# Patient Record
Sex: MALE | Race: WHITE | NOT HISPANIC OR LATINO | URBAN - METROPOLITAN AREA
[De-identification: names, ages, dates, MRNs, and addresses within clinical notes are randomized per-mention and may not be internally consistent; named-entity substitution may affect disease eponyms.]

---

## 2021-03-25 ENCOUNTER — OFFICE (OUTPATIENT)
Dept: URBAN - METROPOLITAN AREA CLINIC 79 | Facility: CLINIC | Age: 81
End: 2021-03-25
Payer: COMMERCIAL

## 2021-03-25 VITALS
HEIGHT: 72 IN | TEMPERATURE: 97.3 F | DIASTOLIC BLOOD PRESSURE: 73 MMHG | SYSTOLIC BLOOD PRESSURE: 120 MMHG | HEART RATE: 67 BPM | WEIGHT: 217 LBS

## 2021-03-25 DIAGNOSIS — Z86.010 PERSONAL HISTORY OF COLONIC POLYPS: ICD-10-CM

## 2021-03-25 DIAGNOSIS — R13.10 DYSPHAGIA, UNSPECIFIED: ICD-10-CM

## 2021-03-25 PROCEDURE — 99204 OFFICE O/P NEW MOD 45 MIN: CPT | Performed by: PHYSICIAN ASSISTANT

## 2021-03-25 NOTE — SERVICEHPINOTES
BALDEMAR RIVERA   is a   80   year old white male who is being seen in consultation at the request of   DIANA GONZALES   for recall colonoscopy. Last colonoscopy in 2/5/2015 removed benign adenomatous polyp and recommend follow-up colonoscopy in 5 years. He has daily BMs, well formed: No blood in stool or BRBPR. He also reports sensation of dysphagia described as "food gets hung" over lower sternal region, occurs with solid foods (dry meats): No issues with pills or liquids. He eats dinner before 7 pm to avoid episodes of indigestion or heartburn. No use of PPI or H2RB. No prior EGD. Former smoker. No ETOH abuse. Denies chest pain, n/v, abdominal pain, change in BMs, weight loss. No other complaints.

## 2021-04-06 ENCOUNTER — OFFICE (OUTPATIENT)
Dept: URBAN - METROPOLITAN AREA CLINIC 34 | Facility: CLINIC | Age: 81
End: 2021-04-06

## 2021-04-06 PROCEDURE — 00038: CPT | Performed by: INTERNAL MEDICINE
